# Patient Record
Sex: FEMALE
[De-identification: names, ages, dates, MRNs, and addresses within clinical notes are randomized per-mention and may not be internally consistent; named-entity substitution may affect disease eponyms.]

---

## 2018-09-14 ENCOUNTER — HOSPITAL ENCOUNTER (OUTPATIENT)
Dept: HOSPITAL 53 - M LAB REF | Age: 58
End: 2018-09-14
Attending: NURSE PRACTITIONER
Payer: COMMERCIAL

## 2018-09-14 DIAGNOSIS — K14.0: Primary | ICD-10-CM

## 2018-09-14 LAB — VITAMIN B12 LEVEL: 581 PG/ML (ref 247–911)

## 2018-09-14 PROCEDURE — 82607 VITAMIN B-12: CPT

## 2019-12-22 ENCOUNTER — HOSPITAL ENCOUNTER (OUTPATIENT)
Dept: HOSPITAL 53 - M ADAMS | Age: 59
End: 2019-12-22
Attending: PHYSICIAN ASSISTANT
Payer: COMMERCIAL

## 2019-12-22 DIAGNOSIS — M19.072: ICD-10-CM

## 2019-12-22 DIAGNOSIS — M77.32: Primary | ICD-10-CM

## 2019-12-22 NOTE — REP
REASON: Pain.

 

PRIORS: None.

 

Mild degenerative changes are seen throughout the foot. There is no acute

fracture. There is a large plantar calcaneal heel spur.

 

 

Electronically Signed by

Sony Salcido DO 12/22/2019 03:25 P

## 2021-02-01 ENCOUNTER — HOSPITAL ENCOUNTER (OUTPATIENT)
Dept: HOSPITAL 53 - M LAB REF | Age: 61
End: 2021-02-01
Attending: NURSE PRACTITIONER
Payer: COMMERCIAL

## 2021-02-01 DIAGNOSIS — D48.5: Primary | ICD-10-CM

## 2021-04-21 ENCOUNTER — HOSPITAL ENCOUNTER (OUTPATIENT)
Dept: HOSPITAL 53 - M LAB REF | Age: 61
End: 2021-04-21
Attending: NURSE PRACTITIONER
Payer: COMMERCIAL

## 2021-04-21 DIAGNOSIS — M10.9: Primary | ICD-10-CM

## 2021-04-21 LAB — URATE SERPL-MCNC: 7.5 MG/DL (ref 2.6–6)

## 2021-04-22 LAB — PTH-INTACT SERPL-MCNC: 56.6 PG/ML (ref 18.5–88)

## 2021-06-23 ENCOUNTER — HOSPITAL ENCOUNTER (OUTPATIENT)
Dept: HOSPITAL 53 - M LAB REF | Age: 61
End: 2021-06-23
Attending: NURSE PRACTITIONER
Payer: COMMERCIAL

## 2021-06-23 DIAGNOSIS — M10.9: Primary | ICD-10-CM

## 2022-01-13 ENCOUNTER — HOSPITAL ENCOUNTER (OUTPATIENT)
Dept: HOSPITAL 53 - M WUC | Age: 62
End: 2022-01-13
Attending: NURSE PRACTITIONER
Payer: COMMERCIAL

## 2022-01-13 DIAGNOSIS — M25.552: Primary | ICD-10-CM

## 2022-02-28 ENCOUNTER — HOSPITAL ENCOUNTER (OUTPATIENT)
Dept: HOSPITAL 53 - M LAB REF | Age: 62
End: 2022-02-28
Attending: NURSE PRACTITIONER
Payer: COMMERCIAL

## 2022-02-28 DIAGNOSIS — R53.83: Primary | ICD-10-CM

## 2022-03-02 LAB
B BURGDOR IGG+IGM SER-ACNC: <0.91 ISR (ref 0–0.9)
B BURGDOR IGM SER IA-ACNC: <0.8 INDEX (ref 0–0.79)

## 2022-05-31 ENCOUNTER — HOSPITAL ENCOUNTER (OUTPATIENT)
Dept: HOSPITAL 53 - M LAB REF | Age: 62
End: 2022-05-31
Attending: NURSE PRACTITIONER
Payer: COMMERCIAL

## 2022-05-31 DIAGNOSIS — Z79.899: Primary | ICD-10-CM

## 2023-01-18 ENCOUNTER — HOSPITAL ENCOUNTER (OUTPATIENT)
Dept: HOSPITAL 53 - M LAB REF | Age: 63
End: 2023-01-18
Attending: NURSE PRACTITIONER
Payer: COMMERCIAL

## 2023-01-18 DIAGNOSIS — M85.80: Primary | ICD-10-CM

## 2023-08-23 ENCOUNTER — HOSPITAL ENCOUNTER (OUTPATIENT)
Dept: HOSPITAL 53 - M LAB REF | Age: 63
End: 2023-08-23
Attending: PHYSICIAN ASSISTANT
Payer: COMMERCIAL

## 2023-08-23 DIAGNOSIS — M85.80: Primary | ICD-10-CM

## 2024-04-10 ENCOUNTER — HOSPITAL ENCOUNTER (OUTPATIENT)
Dept: HOSPITAL 53 - M LAB REF | Age: 64
End: 2024-04-10
Attending: PHYSICIAN ASSISTANT
Payer: COMMERCIAL

## 2024-04-10 DIAGNOSIS — Z79.899: ICD-10-CM

## 2024-04-10 DIAGNOSIS — M85.80: Primary | ICD-10-CM

## 2024-06-20 ENCOUNTER — HOSPITAL ENCOUNTER (OUTPATIENT)
Dept: HOSPITAL 53 - M LAB REF | Age: 64
End: 2024-06-20
Attending: PHYSICIAN ASSISTANT
Payer: COMMERCIAL

## 2024-06-20 DIAGNOSIS — E78.00: Primary | ICD-10-CM

## 2024-06-20 DIAGNOSIS — N18.30: ICD-10-CM

## 2024-06-20 DIAGNOSIS — Z79.899: Primary | ICD-10-CM

## 2024-06-20 LAB
PHOSPHATE SERPL-MCNC: 4.3 MG/DL (ref 2.4–5.1)
PTH-INTACT SERPL-MCNC: 44.3 PG/ML (ref 18.5–88)

## 2024-06-21 LAB — LDLC SERPL DIRECT ASSAY-MCNC: 112 MG/DL (ref ?–100)

## 2024-10-04 ENCOUNTER — HOSPITAL ENCOUNTER (OUTPATIENT)
Dept: HOSPITAL 53 - M RAD | Age: 64
End: 2024-10-04
Attending: INTERNAL MEDICINE
Payer: COMMERCIAL

## 2024-10-04 ENCOUNTER — HOSPITAL ENCOUNTER (OUTPATIENT)
Dept: HOSPITAL 53 - M RAD | Age: 64
End: 2024-10-04
Attending: PHYSICIAN ASSISTANT
Payer: COMMERCIAL

## 2024-10-04 DIAGNOSIS — Z85.3: ICD-10-CM

## 2024-10-04 DIAGNOSIS — M25.562: ICD-10-CM

## 2024-10-04 DIAGNOSIS — R22.1: Primary | ICD-10-CM

## 2024-10-04 DIAGNOSIS — M25.552: Primary | ICD-10-CM
